# Patient Record
Sex: MALE | Race: WHITE | NOT HISPANIC OR LATINO | Employment: FULL TIME | URBAN - METROPOLITAN AREA
[De-identification: names, ages, dates, MRNs, and addresses within clinical notes are randomized per-mention and may not be internally consistent; named-entity substitution may affect disease eponyms.]

---

## 2018-07-03 ENCOUNTER — HOSPITAL ENCOUNTER (EMERGENCY)
Facility: CLINIC | Age: 63
Discharge: HOME | End: 2018-07-03
Attending: EMERGENCY MEDICINE | Admitting: EMERGENCY MEDICINE
Payer: COMMERCIAL

## 2018-07-03 ENCOUNTER — APPOINTMENT (OUTPATIENT)
Dept: GENERAL RADIOLOGY | Facility: CLINIC | Age: 63
End: 2018-07-03
Attending: EMERGENCY MEDICINE
Payer: COMMERCIAL

## 2018-07-03 VITALS
HEART RATE: 84 BPM | SYSTOLIC BLOOD PRESSURE: 138 MMHG | RESPIRATION RATE: 16 BRPM | TEMPERATURE: 98.7 F | DIASTOLIC BLOOD PRESSURE: 84 MMHG | OXYGEN SATURATION: 98 %

## 2018-07-03 DIAGNOSIS — R07.89 CHEST WALL PAIN: ICD-10-CM

## 2018-07-03 DIAGNOSIS — T07.XXXA ABRASIONS OF MULTIPLE SITES: ICD-10-CM

## 2018-07-03 PROCEDURE — 71046 X-RAY EXAM CHEST 2 VIEWS: CPT

## 2018-07-03 PROCEDURE — 99283 EMERGENCY DEPT VISIT LOW MDM: CPT | Mod: 25

## 2018-07-03 PROCEDURE — 25000132 ZZH RX MED GY IP 250 OP 250 PS 637: Performed by: EMERGENCY MEDICINE

## 2018-07-03 RX ORDER — HYDROCODONE BITARTRATE AND ACETAMINOPHEN 5; 325 MG/1; MG/1
2 TABLET ORAL ONCE
Status: COMPLETED | OUTPATIENT
Start: 2018-07-03 | End: 2018-07-03

## 2018-07-03 RX ORDER — HYDROCODONE BITARTRATE AND ACETAMINOPHEN 5; 325 MG/1; MG/1
1-2 TABLET ORAL EVERY 6 HOURS PRN
Qty: 14 TABLET | Refills: 0 | Status: SHIPPED | OUTPATIENT
Start: 2018-07-03

## 2018-07-03 RX ADMIN — HYDROCODONE BITARTRATE AND ACETAMINOPHEN 2 TABLET: 5; 325 TABLET ORAL at 21:11

## 2018-07-03 ASSESSMENT — ENCOUNTER SYMPTOMS
ARTHRALGIAS: 1
SHORTNESS OF BREATH: 1
MYALGIAS: 1
WOUND: 1

## 2018-07-03 NOTE — ED AVS SNAPSHOT
Emergency Department    [address] [place] [address]    Phone:  [phone]    Fax:  [phone]                                       Jordy Zhou   MRN: [id number]    Department:   Emergency Department   Date of Visit:  7/3/2018           After Visit Summary Signature Page     I have received my discharge instructions, and my questions have been answered. I have discussed any challenges I see with this plan with the nurse or doctor.    ..........................................................................................................................................  Patient/Patient Representative Signature      ..........................................................................................................................................  Patient Representative Print Name and Relationship to Patient    ..................................................               ................................................  Date                                            Time    ..........................................................................................................................................  Reviewed by Signature/Title    ...................................................              ..............................................  Date                                                            Time
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
## 2018-07-04 NOTE — DISCHARGE INSTRUCTIONS
Return to the emergency department or seek medical care as instructed if your symptoms fail to improve or significantly worsen.    Take Acetaminophen (aka Tylenol) or Norco (prescription pain medicine) and/or ibuprofen (aka Motrin/Advil, 600mg up to 4 times per day with food) as needed for symptom/pain relief; use as directed.    Ice area of pain for 20 minutes four times per day for the next two days    Use incentive spirometer multiple times per hour for the next several days    Follow-up as indicated on page 1.  Maintain adequate hydration and get plenty of rest.

## 2020-09-25 ENCOUNTER — HOSPITAL PATHOLOGY (OUTPATIENT)
Dept: OTHER | Facility: CLINIC | Age: 65
End: 2020-09-25

## 2020-09-29 LAB — COPATH REPORT: NORMAL

## 2022-02-25 ENCOUNTER — HOSPITAL ENCOUNTER (OUTPATIENT)
Dept: GENERAL RADIOLOGY | Facility: CLINIC | Age: 67
Discharge: HOME | End: 2022-02-25
Attending: OTOLARYNGOLOGY | Admitting: OTOLARYNGOLOGY
Payer: COMMERCIAL

## 2022-02-25 DIAGNOSIS — K21.9 LPRD (LARYNGOPHARYNGEAL REFLUX DISEASE): ICD-10-CM

## 2022-02-25 PROCEDURE — 74220 X-RAY XM ESOPHAGUS 1CNTRST: CPT

## 2022-02-25 PROCEDURE — 255N000001 HC RX 255: Performed by: OTOLARYNGOLOGY

## 2022-02-25 RX ADMIN — ANTACID/ANTIFLATULENT 4 G: 380; 550; 10; 10 GRANULE, EFFERVESCENT ORAL at 10:12

## 2022-03-12 ENCOUNTER — HEALTH MAINTENANCE LETTER (OUTPATIENT)
Age: 67
End: 2022-03-12

## 2023-04-23 ENCOUNTER — HEALTH MAINTENANCE LETTER (OUTPATIENT)
Age: 68
End: 2023-04-23

## 2023-09-13 ENCOUNTER — APPOINTMENT (OUTPATIENT)
Dept: URBAN - METROPOLITAN AREA CLINIC 255 | Age: 68
Setting detail: DERMATOLOGY
End: 2023-09-19

## 2023-09-13 VITALS — WEIGHT: 196 LBS | HEIGHT: 71 IN

## 2023-09-13 DIAGNOSIS — L82.1 OTHER SEBORRHEIC KERATOSIS: ICD-10-CM

## 2023-09-13 DIAGNOSIS — L80 VITILIGO: ICD-10-CM

## 2023-09-13 PROCEDURE — OTHER COUNSELING: OTHER

## 2023-09-13 PROCEDURE — OTHER MIPS QUALITY: OTHER

## 2023-09-13 PROCEDURE — 99204 OFFICE O/P NEW MOD 45 MIN: CPT

## 2023-09-13 PROCEDURE — OTHER PRESCRIPTION: OTHER

## 2023-09-13 RX ORDER — RUXOLITINIB 15 MG/G
CREAM TOPICAL BID
Qty: 60 | Refills: 11 | Status: ERX | COMMUNITY
Start: 2023-09-13

## 2023-09-13 RX ORDER — TACROLIMUS 1 MG/G
OINTMENT TOPICAL BID
Qty: 60 | Refills: 11 | Status: ERX | COMMUNITY
Start: 2023-09-13

## 2023-09-13 ASSESSMENT — LOCATION ZONE DERM
LOCATION ZONE: TRUNK
LOCATION ZONE: ARM

## 2023-09-13 ASSESSMENT — LOCATION SIMPLE DESCRIPTION DERM
LOCATION SIMPLE: RIGHT UPPER BACK
LOCATION SIMPLE: RIGHT ELBOW

## 2023-09-13 ASSESSMENT — LOCATION DETAILED DESCRIPTION DERM
LOCATION DETAILED: RIGHT INFERIOR UPPER BACK
LOCATION DETAILED: RIGHT ELBOW

## 2023-09-29 ENCOUNTER — TRANSCRIBE ORDERS (OUTPATIENT)
Dept: OTHER | Age: 68
End: 2023-09-29

## 2023-09-29 DIAGNOSIS — L80 VITILIGO: Primary | ICD-10-CM

## 2024-03-19 ENCOUNTER — APPOINTMENT (OUTPATIENT)
Dept: CT IMAGING | Facility: CLINIC | Age: 69
End: 2024-03-19
Attending: EMERGENCY MEDICINE
Payer: COMMERCIAL

## 2024-03-19 ENCOUNTER — HOSPITAL ENCOUNTER (EMERGENCY)
Facility: CLINIC | Age: 69
Discharge: HOME | End: 2024-03-20
Attending: EMERGENCY MEDICINE | Admitting: EMERGENCY MEDICINE
Payer: COMMERCIAL

## 2024-03-19 VITALS
OXYGEN SATURATION: 98 % | DIASTOLIC BLOOD PRESSURE: 89 MMHG | TEMPERATURE: 97.8 F | SYSTOLIC BLOOD PRESSURE: 176 MMHG | RESPIRATION RATE: 16 BRPM | HEART RATE: 75 BPM

## 2024-03-19 DIAGNOSIS — S01.111A EYEBROW LACERATION, RIGHT, INITIAL ENCOUNTER: ICD-10-CM

## 2024-03-19 DIAGNOSIS — S09.90XA CLOSED HEAD INJURY, INITIAL ENCOUNTER: ICD-10-CM

## 2024-03-19 PROCEDURE — 12013 RPR F/E/E/N/L/M 2.6-5.0 CM: CPT

## 2024-03-19 PROCEDURE — 70450 CT HEAD/BRAIN W/O DYE: CPT

## 2024-03-19 PROCEDURE — 99284 EMERGENCY DEPT VISIT MOD MDM: CPT | Mod: 25

## 2024-03-19 ASSESSMENT — ACTIVITIES OF DAILY LIVING (ADL): ADLS_ACUITY_SCORE: 33

## 2024-03-19 ASSESSMENT — COLUMBIA-SUICIDE SEVERITY RATING SCALE - C-SSRS
6. HAVE YOU EVER DONE ANYTHING, STARTED TO DO ANYTHING, OR PREPARED TO DO ANYTHING TO END YOUR LIFE?: NO
2. HAVE YOU ACTUALLY HAD ANY THOUGHTS OF KILLING YOURSELF IN THE PAST MONTH?: NO
1. IN THE PAST MONTH, HAVE YOU WISHED YOU WERE DEAD OR WISHED YOU COULD GO TO SLEEP AND NOT WAKE UP?: NO

## 2024-03-20 NOTE — ED PROVIDER NOTES
History     Chief Complaint:  Fall       HPI   Jordy Zhou is a 68 year old male who presents to the ED for evaluation of Fall. Patient was taking a trash out when he rolled over his ankle on a rock around 21:00 today. Patient reports that fell forward onto his palms and struck his right forehead/eyebrow. He has associate symptoms of headache and mild bilateral blurry vision.  No double vision or missing vision or eye pain.  He has some discomfort in the right eyebrow region.  No significant pain in the arms or legs or back or chest or abdomen.  He mentions of having a drink of alcohol prior to the fall. Patient denies any neck pain or injuries. No loss of consciousness. Last tetanus was 2022 per [facility].    Independent Historian:   None - Patient Only      Medications:    Amoxicillin   Cholecalciferol   Cyanocobalamin   Magnesium Oxide   Tacrolimus     Past Medical History:    Neoplasm of colon   Hypertension  Neuropathy  PVD  ERM left eye  Osteoarthrosis  Dilated aortic root  PE  Hyperhidrosis  Migraine  Insomnia  Vitamin B12 deficiency   Vitiligo  Polyp of colon  Neuromuscular disorder    Past Surgical History:    Orthopedic surgery  Tonsillectomy  Sinus surgery      Physical Exam   Patient Vitals for the past 24 hrs:   BP Temp Pulse Resp SpO2   03/19/24 2149 (!) 176/89 97.8  F (36.6  C) 75 16 98 %        Physical Exam  VS: Reviewed per above  HENT: Mucous membranes moist, no nuchal rigidity.  2 lacerations to the right eyebrow.  One is V-shaped and 2 cm, the other below this is 1 cm and linear.  There is scant debris in the lacerations.  EYES: sclera anicteric  CV: Rate as noted,  regular rhythm.   RESP: Effort normal. Breath sounds are normal bilaterally.  NEURO: GCS 15, cranial nerves II through XII are intact, 5 out of 5 strength in all 4 extremities, sensation is intact light touch in all 4 extremities  MSK: No deformity of the extremities.  No midline vertebral tenderness.  SKIN: Warm and dry.   Bilateral hand abrasions.    Emergency Department Course     Imaging:  Head CT w/o contrast   Final Result   IMPRESSION:   1.  No acute intracranial process.   2.  Status post functional endoscopic sinus surgery as described.           Laboratory:  Labs Ordered and Resulted from Time of ED Arrival to Time of ED Departure - No data to display     Procedures       Laceration Repair      Procedure: Laceration Repair    Indication: Laceration    Consent: Verbal    Location: Right eyebrow    Length: 2cm and 1cm    Preparation: Irrigation with Sterile Saline.    Anesthesia/Sedation: Lidocaine - 1%      Treatment/Exploration: Wound explored and minimal debris removed     Closure: The wounds were closed with one layer. Skin/superficial layer was closed with 6 x 5-0 Fast gut absorbable  using Interrupted sutures.  The other laceration was closed with 4 x 5-0 Fast gut absorbable  using Interrupted sutures    Patient Status: The patient tolerated the procedure well: Yes. There were no complicationn.       Emergency Department Course & Assessments:    Interventions:  Medications - No data to display       Consultations/Discussion of Management or Tests:     ED Course as of 03/20/24 0138   Tue Mar 19, 2024   2232 I obtained history and examined the patient as above.    Wed Mar 20, 2024   0046 We discussed plans for discharge and the patient is comfortable with this plan.        Disposition:  The patient was discharged.     Impression & Plan    CMS Diagnoses: None        Medical Decision Making:  Patient presents to the ER after mechanical fall and head injury with right eyebrow laceration.  Patient has mild hypertension.  He is neurologically intact aside from some mild blurry vision bilaterally, which seems to be concussion related rather than due to sinister ocular pathology.  History not supportive of open globe, ocular foreign body, retinal detachment, acute angle-closure glaucoma.  Injuries appear to be limited to the right  eyebrow region.  He has some abrasions to the hands but no deep lacerations.  He washed these with soap at the bedside.  Noncontrast head CT is negative for acute injury.  Lacerations repaired per procedure note above with dissolvable suture.  Discussed head injury and wound return precautions with patient and spouse.  All questions answered prior to discharge.    Diagnosis:    ICD-10-CM    1. Closed head injury, initial encounter  S09.90XA       2. Eyebrow laceration, right, initial encounter  S01.111A            Discharge Medications:  Discharge Medication List as of 3/20/2024 12:06 AM         Scribe Disclosure:  Lola MCGILL, kwasi serving as a scribe at 11:02 PM on 3/19/2024 to document services personally performed by Aftab More MD based on my observations and the provider's statements to me.     3/19/2024   Aftab More MD Lindenbaum, Elan, MD  03/20/24 0155

## 2024-03-20 NOTE — ED TRIAGE NOTES
Pt tripped and fell in driveway hitting head while taking out the trash - lacerations noted above right eyebrow, controlled bleeding. Denies loc or bloodthinners.      Triage Assessment (Adult)       Row Name 03/19/24 3354          Respiratory WDL    Respiratory WDL WDL        Cardiac WDL    Cardiac WDL WDL        Cognitive/Neuro/Behavioral WDL    Cognitive/Neuro/Behavioral WDL WDL

## 2024-04-24 ENCOUNTER — APPOINTMENT (OUTPATIENT)
Dept: URBAN - METROPOLITAN AREA CLINIC 255 | Age: 69
Setting detail: DERMATOLOGY
End: 2024-04-24

## 2024-04-24 DIAGNOSIS — L80 VITILIGO: ICD-10-CM

## 2024-04-24 DIAGNOSIS — L57.0 ACTINIC KERATOSIS: ICD-10-CM

## 2024-04-24 PROCEDURE — OTHER COUNSELING: OTHER

## 2024-04-24 PROCEDURE — OTHER MIPS QUALITY: OTHER

## 2024-04-24 PROCEDURE — 17000 DESTRUCT PREMALG LESION: CPT

## 2024-04-24 PROCEDURE — OTHER LIQUID NITROGEN: OTHER

## 2024-04-24 PROCEDURE — 99213 OFFICE O/P EST LOW 20 MIN: CPT | Mod: 25

## 2024-04-24 ASSESSMENT — LOCATION ZONE DERM: LOCATION ZONE: FACE

## 2024-04-24 ASSESSMENT — LOCATION DETAILED DESCRIPTION DERM: LOCATION DETAILED: LEFT CENTRAL MALAR CHEEK

## 2024-04-24 ASSESSMENT — LOCATION SIMPLE DESCRIPTION DERM: LOCATION SIMPLE: LEFT CHEEK

## 2024-04-24 NOTE — PROCEDURE: COUNSELING
Detail Level: Detailed
Patient Specific Counseling (Will Not Stick From Patient To Patient): Discussed options: \\n- PDT\\n- Chemotherapy cream, 2 prescription options, one would be applied for 4 weeks, the other rx would be 4-7 days\\n- tx with liquid nitrogen\\n- Pt prefers to treat today with liquid nitrogen
Patient Specific Counseling (Will Not Stick From Patient To Patient): - Explained that there may be more treatment options coming in the future \\n- Advised to contact clinic if he would like to try opzelura again, usually takes 4-6 months of treatment to see improvement.

## 2024-04-24 NOTE — HPI: SKIN LESION
What Type Of Note Output Would You Prefer (Optional)?: Standard Output
Has Your Skin Lesion Been Treated?: not been treated
Is This A New Presentation, Or A Follow-Up?: Skin Lesion
Additional History: Pt also notes he had a lesion on the left thigh that was itchy and present for 3 month, but it has now resolved.

## 2024-06-29 ENCOUNTER — HEALTH MAINTENANCE LETTER (OUTPATIENT)
Age: 69
End: 2024-06-29

## 2025-07-13 ENCOUNTER — HEALTH MAINTENANCE LETTER (OUTPATIENT)
Age: 70
End: 2025-07-13